# Patient Record
Sex: FEMALE | Race: WHITE | NOT HISPANIC OR LATINO | URBAN - METROPOLITAN AREA
[De-identification: names, ages, dates, MRNs, and addresses within clinical notes are randomized per-mention and may not be internally consistent; named-entity substitution may affect disease eponyms.]

---

## 2022-06-07 ENCOUNTER — APPOINTMENT (OUTPATIENT)
Age: 37
Setting detail: DERMATOLOGY
End: 2022-06-09

## 2022-06-07 VITALS — TEMPERATURE: 98 F

## 2022-06-07 DIAGNOSIS — L259 CONTACT DERMATITIS AND OTHER ECZEMA, UNSPECIFIED CAUSE: ICD-10-CM

## 2022-06-07 DIAGNOSIS — L65.9 NONSCARRING HAIR LOSS, UNSPECIFIED: ICD-10-CM

## 2022-06-07 DIAGNOSIS — L21.8 OTHER SEBORRHEIC DERMATITIS: ICD-10-CM

## 2022-06-07 PROBLEM — L23.9 ALLERGIC CONTACT DERMATITIS, UNSPECIFIED CAUSE: Status: ACTIVE | Noted: 2022-06-07

## 2022-06-07 PROCEDURE — OTHER PATIENT SPECIFIC COUNSELING: OTHER

## 2022-06-07 PROCEDURE — OTHER PRESCRIPTION MEDICATION MANAGEMENT: OTHER

## 2022-06-07 PROCEDURE — OTHER PRESCRIPTION: OTHER

## 2022-06-07 PROCEDURE — OTHER MIPS QUALITY: OTHER

## 2022-06-07 PROCEDURE — 99203 OFFICE O/P NEW LOW 30 MIN: CPT

## 2022-06-07 PROCEDURE — OTHER COUNSELING: OTHER

## 2022-06-07 RX ORDER — MOMETASONE FUROATE 1 MG/G
CREAM TOPICAL
Qty: 45 | Refills: 0 | Status: ERX | COMMUNITY
Start: 2022-06-07

## 2022-06-07 ASSESSMENT — LOCATION SIMPLE DESCRIPTION DERM
LOCATION SIMPLE: LEFT SCALP
LOCATION SIMPLE: POSTERIOR SCALP

## 2022-06-07 ASSESSMENT — LOCATION DETAILED DESCRIPTION DERM
LOCATION DETAILED: LEFT MEDIAL FRONTAL SCALP
LOCATION DETAILED: POSTERIOR MID-PARIETAL SCALP

## 2022-06-07 ASSESSMENT — LOCATION ZONE DERM: LOCATION ZONE: SCALP

## 2022-06-07 ASSESSMENT — SEVERITY ASSESSMENT: HOW SEVERE IS THIS PATIENT'S CONDITION?: MILD

## 2022-06-07 NOTE — PROCEDURE: PATIENT SPECIFIC COUNSELING
Detail Level: Zone
- HX of thyroid abnormalities  Thyroid level 9. (Endocrinologist: Dr. [facility])\\n- [facility] ([place] [facility]): Dr. Magaña

## 2022-06-07 NOTE — PROCEDURE: PRESCRIPTION MEDICATION MANAGEMENT
Render In Strict Bullet Format?: No
Detail Level: Zone
Discontinue Regimen: - Discontinue or limit straightening hair or using hot tools.
Plan: - Will hold off on any oral medications due to IVF\\n- Patient defers ILK injections at this time due to allergen
Continue Regimen: Prenatal vitamin
Modify Regimen: Do not style or brush hair when wet and most fragile. If needed, use root cover up and do not dye hair more than once every 6-8 weeks
Initiate Treatment: - Dove shampoo and conditioner \\n- Crave detangling brush ([employer])\\n- Free & Clear anti dandruff shampoo
Plan: Recommended free and clear anti-dandruff shampoo

## 2022-06-07 NOTE — HPI: HAIR LOSS (ALOPECIA AREATA)
How Severe Is It?: moderate
Is This A New Presentation, Or A Follow-Up?: Hair Loss
Additional History: - HX of thyroid abnormalities  Thyroid level 9. (Endocrinologist: Dr. [facility])\\n- [facility] ([place] [facility]): Dr. Magaña

## 2022-09-23 ENCOUNTER — APPOINTMENT (OUTPATIENT)
Age: 37
Setting detail: DERMATOLOGY
End: 2022-09-23

## 2022-09-23 VITALS — TEMPERATURE: 97.9 F

## 2022-09-23 DIAGNOSIS — L65.0 TELOGEN EFFLUVIUM: ICD-10-CM

## 2022-09-23 PROBLEM — L65.9 NONSCARRING HAIR LOSS, UNSPECIFIED: Status: ACTIVE | Noted: 2022-09-23

## 2022-09-23 PROCEDURE — OTHER COUNSELING: OTHER

## 2022-09-23 PROCEDURE — OTHER ADDITIONAL NOTES: OTHER

## 2022-09-23 PROCEDURE — OTHER MIPS QUALITY: OTHER

## 2022-09-23 PROCEDURE — 99212 OFFICE O/P EST SF 10 MIN: CPT

## 2022-09-23 ASSESSMENT — LOCATION SIMPLE DESCRIPTION DERM: LOCATION SIMPLE: SCALP

## 2022-09-23 ASSESSMENT — LOCATION DETAILED DESCRIPTION DERM
LOCATION DETAILED: LEFT SUPERIOR PARIETAL SCALP
LOCATION DETAILED: RIGHT CENTRAL PARIETAL SCALP
LOCATION DETAILED: RIGHT SUPERIOR PARIETAL SCALP
LOCATION DETAILED: LEFT CENTRAL PARIETAL SCALP

## 2022-09-23 ASSESSMENT — LOCATION ZONE DERM: LOCATION ZONE: SCALP

## 2022-09-23 NOTE — PROCEDURE: ADDITIONAL NOTES
Detail Level: Simple
Additional Notes: Patient has significant anxiety and has been under tremendous stress for the past several years. She is currently undergoing IVF. 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 Patient has had extensive bloodwork performed by her endocrinologist - thyroid function is normal, she denies any anemia or elevated testosterone levels. She understands that while she is attempting to become pregnant, treatment for hair loss is restrictive. We discussed her anxiety and I suggested that she seek mental health care and consider initiating an anti-anxiety medication. I recommend that she see Dr. Mcgill in [place] for additional evaluation.
Render Risk Assessment In Note?: no